# Patient Record
Sex: MALE | Race: WHITE | NOT HISPANIC OR LATINO | Employment: FULL TIME | URBAN - METROPOLITAN AREA
[De-identification: names, ages, dates, MRNs, and addresses within clinical notes are randomized per-mention and may not be internally consistent; named-entity substitution may affect disease eponyms.]

---

## 2022-12-20 ENCOUNTER — OFFICE VISIT (OUTPATIENT)
Dept: URGENT CARE | Facility: CLINIC | Age: 57
End: 2022-12-20

## 2022-12-20 VITALS
RESPIRATION RATE: 18 BRPM | OXYGEN SATURATION: 99 % | HEIGHT: 69 IN | HEART RATE: 97 BPM | SYSTOLIC BLOOD PRESSURE: 148 MMHG | DIASTOLIC BLOOD PRESSURE: 100 MMHG | WEIGHT: 315 LBS | BODY MASS INDEX: 46.65 KG/M2 | TEMPERATURE: 98.4 F

## 2022-12-20 DIAGNOSIS — U07.1 COVID: Primary | ICD-10-CM

## 2022-12-20 LAB
SARS-COV-2 AG UPPER RESP QL IA: POSITIVE
VALID CONTROL: ABNORMAL

## 2022-12-20 RX ORDER — RIVAROXABAN 20 MG/1
20 TABLET, FILM COATED ORAL DAILY
COMMUNITY
Start: 2022-11-30

## 2022-12-20 RX ORDER — DAPAGLIFLOZIN AND METFORMIN HYDROCHLORIDE 5; 1000 MG/1; MG/1
1 TABLET, FILM COATED, EXTENDED RELEASE ORAL 2 TIMES DAILY
COMMUNITY
Start: 2022-12-11

## 2022-12-20 RX ORDER — DULAGLUTIDE 3 MG/.5ML
INJECTION, SOLUTION SUBCUTANEOUS
COMMUNITY
Start: 2022-11-25

## 2022-12-20 RX ORDER — ATORVASTATIN CALCIUM 20 MG/1
20 TABLET, FILM COATED ORAL EVERY EVENING
COMMUNITY
Start: 2022-11-29

## 2022-12-20 RX ORDER — ATENOLOL 100 MG/1
100 TABLET ORAL DAILY
COMMUNITY
Start: 2022-11-23

## 2022-12-20 RX ORDER — INSULIN GLARGINE 300 U/ML
INJECTION, SOLUTION SUBCUTANEOUS
COMMUNITY
Start: 2022-12-19

## 2022-12-20 RX ORDER — AMLODIPINE BESYLATE AND BENAZEPRIL HYDROCHLORIDE 5; 40 MG/1; MG/1
1 CAPSULE ORAL DAILY
COMMUNITY
Start: 2022-11-25

## 2022-12-20 RX ORDER — PEN NEEDLE, DIABETIC 29 G X1/2"
NEEDLE, DISPOSABLE MISCELLANEOUS
COMMUNITY
Start: 2022-11-20

## 2022-12-20 NOTE — PROGRESS NOTES
[facility]        NAME: Renata Juarez is a 62 y o  male  : 1965    MRN: [id number]  DATE: 2022  TIME: 9:03 AM    /100   Pulse 97   Temp 98 4 °F (36 9 °C)   Resp 18   Ht 5' 9" (1 753 m)   Wt (!) 159 kg (350 lb)   SpO2 99%   BMI 51 69 kg/m²     Assessment and Plan   COVID [U07 1]  1  COVID  Poct Covid 19 Rapid Antigen Test            Patient Instructions       Follow up with PCP in 3-5 days  Proceed to  ER if symptoms worsen  Chief Complaint     Chief Complaint   Patient presents with   • Cough     Pt reports productive cough with symptom onset   Reports periods of chills last night unsure of any fever  Denies testing at home for Covid  Managing symptoms with cough drops with some symptom relief  History of Present Illness       Pt with 2-3 days of productive cough and congestion some chills       Review of Systems   Review of Systems   Constitutional: Positive for chills, fatigue and fever  HENT: Positive for congestion  Eyes: Negative  Respiratory: Positive for cough  Cardiovascular: Negative  Gastrointestinal: Negative  Endocrine: Negative  Genitourinary: Negative  Musculoskeletal: Negative  Allergic/Immunologic: Negative  Neurological: Negative  Hematological: Negative  Psychiatric/Behavioral: Negative  All other systems reviewed and are negative          Current Medications       Current Outpatient Medications:   •  amLODIPine-benazepril (LOTREL) 5-40 MG per capsule, Take 1 capsule by mouth daily, Disp: , Rfl:   •  atenolol (TENORMIN) 100 mg tablet, Take 100 mg by mouth daily, Disp: , Rfl:   •  atorvastatin (LIPITOR) 20 mg tablet, Take 20 mg by mouth every evening, Disp: , Rfl:   •  BD ULTRA-FINE PEN NEEDLES 29G X 12 7MM MISC, USE ON NEEDLE WITH DAILY TOUJEO INJECTION, Disp: , Rfl:   •  Toujeo SoloStar 300 units/mL CONCENTRATED U-300 injection pen (1-unit dial), , Disp: , Rfl:   •  Trulicity 3 / 1SK SOPN, INJECT 1 PEN ONCE A WEEK, Disp: , Rfl:   •  Xarelto 20 MG tablet, Take 20 mg by mouth daily, Disp: , Rfl:   •  Xigduo XR 5-1000 MG TB24, Take 1 tablet by mouth 2 (two) times a day, Disp: , Rfl:     Current Allergies     Allergies as of 12/20/2022   • (No Known Allergies)            The following portions of the patient's history were reviewed and updated as appropriate: allergies, current medications, past family history, past medical history, past social history, past surgical history and problem list      History reviewed  No pertinent past medical history  History reviewed  No pertinent surgical history  History reviewed  No pertinent family history  Medications have been verified  Objective   /100   Pulse 97   Temp 98 4 °F (36 9 °C)   Resp 18   Ht 5' 9" (1 753 m)   Wt (!) 159 kg (350 lb)   SpO2 99%   BMI 51 69 kg/m²        Physical Exam     Physical Exam  Vitals and nursing note reviewed  Constitutional:       Appearance: Normal appearance  He is normal weight  Comments: Pt on xeralt, have no recent liver and kidney blood eval,  Discussed with pt, he will contact family doctor for possible paxlovid options   HENT:      Head: Normocephalic and atraumatic  Right Ear: Tympanic membrane, ear canal and external ear normal       Left Ear: Tympanic membrane, ear canal and external ear normal       Nose: Congestion and rhinorrhea present  Comments: Clear d/c      Mouth/Throat:      Mouth: Mucous membranes are moist       Pharynx: Oropharynx is clear  Eyes:      Extraocular Movements: Extraocular movements intact  Conjunctiva/sclera: Conjunctivae normal       Pupils: Pupils are equal, round, and reactive to light  Cardiovascular:      Rate and Rhythm: Normal rate and regular rhythm  Pulses: Normal pulses  Heart sounds: Normal heart sounds  Pulmonary:      Effort: Pulmonary effort is normal       Breath sounds: Normal breath sounds     Abdominal:      General: Abdomen is flat  Bowel sounds are normal       Palpations: Abdomen is soft  Musculoskeletal:         General: Normal range of motion  Cervical back: Normal range of motion and neck supple  Skin:     General: Skin is warm  Capillary Refill: Capillary refill takes less than 2 seconds  Neurological:      General: No focal deficit present  Mental Status: He is alert and oriented to person, place, and time     Psychiatric:         Mood and Affect: Mood normal          Behavior: Behavior normal

## 2022-12-20 NOTE — LETTER
[place] [id number]  Dept: [phone]    December 20, 2022    Patient: Isaiah Zavala  YOB: 1965    Isaiah Zavala was seen and evaluated at our [facility]  Please note if Covid and Flu tests are negative, they may return to work when fever free for 24 hours without the use of a fever reducing agent  If Covid or Flu test is positive, they may return to work on 12/24/2022 , as this is 5 days from the onset of symptoms  Upon return, they must then adhere to strict masking for an additional 5 days      Sincerely,    Juan Howard PA-C

## 2024-10-09 ENCOUNTER — APPOINTMENT (OUTPATIENT)
Dept: RADIOLOGY | Facility: CLINIC | Age: 59
End: 2024-10-09
Payer: OTHER MISCELLANEOUS

## 2024-10-09 ENCOUNTER — OCCMED (OUTPATIENT)
Dept: URGENT CARE | Facility: CLINIC | Age: 59
End: 2024-10-09
Payer: OTHER MISCELLANEOUS

## 2024-10-09 DIAGNOSIS — M79.641 PAIN OF RIGHT HAND: ICD-10-CM

## 2024-10-09 DIAGNOSIS — M79.641 PAIN OF RIGHT HAND: Primary | ICD-10-CM

## 2024-10-09 PROCEDURE — 99283 EMERGENCY DEPT VISIT LOW MDM: CPT

## 2024-10-09 PROCEDURE — 73130 X-RAY EXAM OF HAND: CPT

## 2024-10-09 PROCEDURE — G0382 LEV 3 HOSP TYPE B ED VISIT: HCPCS

## 2024-10-11 ENCOUNTER — APPOINTMENT (OUTPATIENT)
Dept: URGENT CARE | Facility: CLINIC | Age: 59
End: 2024-10-11
Payer: OTHER MISCELLANEOUS

## 2024-10-11 PROCEDURE — 99213 OFFICE O/P EST LOW 20 MIN: CPT | Performed by: PHYSICIAN ASSISTANT
